# Patient Record
Sex: FEMALE | Race: WHITE | NOT HISPANIC OR LATINO | URBAN - METROPOLITAN AREA
[De-identification: names, ages, dates, MRNs, and addresses within clinical notes are randomized per-mention and may not be internally consistent; named-entity substitution may affect disease eponyms.]

---

## 2024-08-28 PROCEDURE — 87624 HPV HI-RISK TYP POOLED RSLT: CPT

## 2024-08-28 PROCEDURE — 88142 CYTOPATH C/V THIN LAYER: CPT

## 2024-08-29 ENCOUNTER — LAB REQUISITION (OUTPATIENT)
Dept: LAB | Facility: HOSPITAL | Age: 34
End: 2024-08-29
Payer: COMMERCIAL

## 2024-08-29 DIAGNOSIS — Z01.419 ENCOUNTER FOR GYNECOLOGICAL EXAMINATION (GENERAL) (ROUTINE) WITHOUT ABNORMAL FINDINGS: ICD-10-CM

## 2024-08-29 DIAGNOSIS — Z11.51 ENCOUNTER FOR SCREENING FOR HUMAN PAPILLOMAVIRUS (HPV): ICD-10-CM

## 2024-09-09 ENCOUNTER — LAB (OUTPATIENT)
Dept: LAB | Facility: LAB | Age: 34
End: 2024-09-09
Payer: COMMERCIAL

## 2024-09-09 DIAGNOSIS — N92.6 IRREGULAR MENSTRUATION, UNSPECIFIED: Primary | ICD-10-CM

## 2024-09-09 LAB
EST. AVERAGE GLUCOSE BLD GHB EST-MCNC: 100 MG/DL
HBA1C MFR BLD: 5.1 %
PROGEST SERPL-MCNC: 9.4 NG/ML

## 2024-09-09 PROCEDURE — 84443 ASSAY THYROID STIM HORMONE: CPT

## 2024-09-09 PROCEDURE — 83036 HEMOGLOBIN GLYCOSYLATED A1C: CPT

## 2024-09-09 PROCEDURE — 36415 COLL VENOUS BLD VENIPUNCTURE: CPT

## 2024-09-09 PROCEDURE — 84144 ASSAY OF PROGESTERONE: CPT

## 2024-09-09 PROCEDURE — 83516 IMMUNOASSAY NONANTIBODY: CPT

## 2024-09-10 LAB
CYTOLOGY CMNT CVX/VAG CYTO-IMP: NORMAL
HPV HR 12 DNA GENITAL QL NAA+PROBE: NEGATIVE
HPV HR GENOTYPES PNL CVX NAA+PROBE: NEGATIVE
HPV16 DNA SPEC QL NAA+PROBE: NEGATIVE
HPV18 DNA SPEC QL NAA+PROBE: NEGATIVE
LAB AP HPV GENOTYPE QUESTION: YES
LAB AP HPV HR: NORMAL
LABORATORY COMMENT REPORT: NORMAL
LABORATORY COMMENT REPORT: NORMAL
LMP START DATE: NORMAL
PATH REPORT.TOTAL CANCER: NORMAL
TSH SERPL-ACNC: 3.33 MIU/L (ref 0.44–3.98)

## 2024-09-12 LAB — MIS SERPL-MCNC: 2.31 NG/ML (ref 0.18–11.71)

## 2024-11-14 ENCOUNTER — APPOINTMENT (OUTPATIENT)
Dept: PRIMARY CARE | Facility: CLINIC | Age: 34
End: 2024-11-14
Payer: COMMERCIAL

## 2025-01-23 PROCEDURE — 88305 TISSUE EXAM BY PATHOLOGIST: CPT

## 2025-01-24 ENCOUNTER — LAB REQUISITION (OUTPATIENT)
Dept: LAB | Facility: HOSPITAL | Age: 35
End: 2025-01-24
Payer: COMMERCIAL

## 2025-01-24 DIAGNOSIS — N93.9 ABNORMAL UTERINE AND VAGINAL BLEEDING, UNSPECIFIED: ICD-10-CM

## 2025-01-28 LAB
LABORATORY COMMENT REPORT: NORMAL
PATH REPORT.FINAL DX SPEC: NORMAL
PATH REPORT.GROSS SPEC: NORMAL
PATH REPORT.RELEVANT HX SPEC: NORMAL
PATH REPORT.TOTAL CANCER: NORMAL

## 2025-07-29 NOTE — PROGRESS NOTES
"Subjective   Patient ID: Gunjan Roberts is a 35 y.o. female who presents for Establish Care (Constantly fatigued and would like lab work done; Hx of depression and anxiety ).    HPI    Fatigue: She states she is never feeling refreshed. She is wanting to nap at times. Since she's had children. Her first was born in 2015. Getting worse. She has broken sleep. Knows this effects.   She has changed her diet also. She is not eating fast food, eating more \"clean\" She started 3-5 days per week of exercise now.     Anxiety/depression: Used to take prozac. Stopped medication while pregnant and never restarted. She lost her dad unexpectedly in 2020, she has had some issues with this. She can get out of bed. Keeps moving forward.     Following with OBGYN, had blood work done in September 2024.   Pap done 8/2024 and normal. She had nexplanon and had issues with bleeding. She had gained weight.     Has 4 children ages 9, 6, 4, 3.       Review of systems completed and unremarkable other than what is documented in HPI.    Objective   /77 (BP Location: Right arm, Patient Position: Sitting, BP Cuff Size: Adult)   Pulse 88   Wt 63 kg (139 lb)     Patient Health Questionnaire-2 Score: 0 (8/5/2025 10:58 AM)    Physical Exam    Gen: No acute distress, alert and oriented x3, pleasant   HEENT: moist mucous membranes, b/l external auditory canals are clear of debris, TMs within normal limits, no oropharyngeal lesions, eomi, perrla   Neck: thyroid within normal limits, no lymphadenopathy   CV: RRR, normal S1/S2, no murmur   Resp: Clear to auscultation bilaterally, no wheezes or rhonchi appreciated  Abd: soft, nontender, non-distended, no guarding/rigidity, bowel sounds present  Extr: no edema, no calf tenderness  Derm: Skin is warm and dry, no rashes appreciated  Psych: mood is good, affect is congruent, good hygiene, normal speech and eye contact  Neuro: cranial nerves grossly intact, normal gait      Assessment/Plan "       #Fatigue   Working on diet and exercises   Checking blood work     #Anxiety/Depression   States she is not as bad as previously   She has had some decline   Not requesting medications at this time   Checking blood work     #HCM  Pap 8/2024, normal

## 2025-08-05 ENCOUNTER — APPOINTMENT (OUTPATIENT)
Dept: PRIMARY CARE | Facility: CLINIC | Age: 35
End: 2025-08-05
Payer: COMMERCIAL

## 2025-08-05 VITALS — HEART RATE: 88 BPM | SYSTOLIC BLOOD PRESSURE: 120 MMHG | WEIGHT: 139 LBS | DIASTOLIC BLOOD PRESSURE: 77 MMHG

## 2025-08-05 DIAGNOSIS — R53.83 OTHER FATIGUE: ICD-10-CM

## 2025-08-05 DIAGNOSIS — E55.9 VITAMIN D DEFICIENCY: ICD-10-CM

## 2025-08-05 DIAGNOSIS — Z76.89 ENCOUNTER TO ESTABLISH CARE: ICD-10-CM

## 2025-08-05 DIAGNOSIS — Z13.220 SCREENING CHOLESTEROL LEVEL: ICD-10-CM

## 2025-08-05 DIAGNOSIS — Z00.00 ANNUAL PHYSICAL EXAM: Primary | ICD-10-CM

## 2025-08-05 DIAGNOSIS — Z13.29 THYROID DISORDER SCREENING: ICD-10-CM

## 2025-08-05 PROCEDURE — 99395 PREV VISIT EST AGE 18-39: CPT | Performed by: REGISTERED NURSE

## 2025-08-05 PROCEDURE — 1036F TOBACCO NON-USER: CPT | Performed by: REGISTERED NURSE

## 2025-08-05 PROCEDURE — 99214 OFFICE O/P EST MOD 30 MIN: CPT | Performed by: REGISTERED NURSE

## 2025-08-05 ASSESSMENT — PATIENT HEALTH QUESTIONNAIRE - PHQ9
SUM OF ALL RESPONSES TO PHQ9 QUESTIONS 1 & 2: 0
1. LITTLE INTEREST OR PLEASURE IN DOING THINGS: NOT AT ALL
2. FEELING DOWN, DEPRESSED OR HOPELESS: NOT AT ALL

## 2025-08-13 ENCOUNTER — RESULTS FOLLOW-UP (OUTPATIENT)
Dept: PRIMARY CARE | Facility: CLINIC | Age: 35
End: 2025-08-13
Payer: COMMERCIAL

## 2025-08-13 LAB
25(OH)D3+25(OH)D2 SERPL-MCNC: 103 NG/ML (ref 30–100)
ALBUMIN SERPL-MCNC: 5 G/DL (ref 3.6–5.1)
ALP SERPL-CCNC: 50 U/L (ref 31–125)
ALT SERPL-CCNC: 13 U/L (ref 6–29)
ANION GAP SERPL CALCULATED.4IONS-SCNC: 11 MMOL/L (CALC) (ref 7–17)
AST SERPL-CCNC: 19 U/L (ref 10–30)
BASOPHILS # BLD AUTO: 41 CELLS/UL (ref 0–200)
BASOPHILS NFR BLD AUTO: 0.6 %
BILIRUB SERPL-MCNC: 0.6 MG/DL (ref 0.2–1.2)
BUN SERPL-MCNC: 4 MG/DL (ref 7–25)
CALCIUM SERPL-MCNC: 9.7 MG/DL (ref 8.6–10.2)
CHLORIDE SERPL-SCNC: 102 MMOL/L (ref 98–110)
CHOLEST SERPL-MCNC: 171 MG/DL
CHOLEST/HDLC SERPL: 3.1 (CALC)
CO2 SERPL-SCNC: 25 MMOL/L (ref 20–32)
CREAT SERPL-MCNC: 0.51 MG/DL (ref 0.5–0.97)
EGFRCR SERPLBLD CKD-EPI 2021: 124 ML/MIN/1.73M2
EOSINOPHIL # BLD AUTO: 152 CELLS/UL (ref 15–500)
EOSINOPHIL NFR BLD AUTO: 2.2 %
ERYTHROCYTE [DISTWIDTH] IN BLOOD BY AUTOMATED COUNT: 12.9 % (ref 11–15)
FERRITIN SERPL-MCNC: 15 NG/ML (ref 16–154)
GLUCOSE SERPL-MCNC: 89 MG/DL (ref 65–99)
HCT VFR BLD AUTO: 39.3 % (ref 35–45)
HDLC SERPL-MCNC: 56 MG/DL
HGB BLD-MCNC: 13.1 G/DL (ref 11.7–15.5)
IRON SATN MFR SERPL: 12 % (CALC) (ref 16–45)
IRON SERPL-MCNC: 43 MCG/DL (ref 40–190)
LDLC SERPL CALC-MCNC: 97 MG/DL (CALC)
LYMPHOCYTES # BLD AUTO: 2222 CELLS/UL (ref 850–3900)
LYMPHOCYTES NFR BLD AUTO: 32.2 %
MCH RBC QN AUTO: 32.1 PG (ref 27–33)
MCHC RBC AUTO-ENTMCNC: 33.3 G/DL (ref 32–36)
MCV RBC AUTO: 96.3 FL (ref 80–100)
MONOCYTES # BLD AUTO: 428 CELLS/UL (ref 200–950)
MONOCYTES NFR BLD AUTO: 6.2 %
NEUTROPHILS # BLD AUTO: 4057 CELLS/UL (ref 1500–7800)
NEUTROPHILS NFR BLD AUTO: 58.8 %
NONHDLC SERPL-MCNC: 115 MG/DL (CALC)
PLATELET # BLD AUTO: 285 THOUSAND/UL (ref 140–400)
PMV BLD REES-ECKER: 11.9 FL (ref 7.5–12.5)
POTASSIUM SERPL-SCNC: 4.6 MMOL/L (ref 3.5–5.3)
PROT SERPL-MCNC: 7.6 G/DL (ref 6.1–8.1)
RBC # BLD AUTO: 4.08 MILLION/UL (ref 3.8–5.1)
SODIUM SERPL-SCNC: 138 MMOL/L (ref 135–146)
TIBC SERPL-MCNC: 353 MCG/DL (CALC) (ref 250–450)
TRIGL SERPL-MCNC: 87 MG/DL
TSH SERPL-ACNC: 2.7 MIU/L
VIT B12 SERPL-MCNC: 716 PG/ML (ref 200–1100)
WBC # BLD AUTO: 6.9 THOUSAND/UL (ref 3.8–10.8)